# Patient Record
Sex: MALE | Race: WHITE | Employment: FULL TIME | URBAN - METROPOLITAN AREA
[De-identification: names, ages, dates, MRNs, and addresses within clinical notes are randomized per-mention and may not be internally consistent; named-entity substitution may affect disease eponyms.]

---

## 2018-07-10 ENCOUNTER — HOSPITAL ENCOUNTER (OUTPATIENT)
Age: 38
Discharge: HOME | End: 2018-07-10
Attending: EMERGENCY MEDICINE
Payer: COMMERCIAL

## 2018-07-10 ENCOUNTER — APPOINTMENT (OUTPATIENT)
Dept: GENERAL RADIOLOGY | Age: 38
End: 2018-07-10
Attending: EMERGENCY MEDICINE
Payer: COMMERCIAL

## 2018-07-10 VITALS
OXYGEN SATURATION: 96 % | SYSTOLIC BLOOD PRESSURE: 120 MMHG | TEMPERATURE: 99 F | HEIGHT: 71 IN | WEIGHT: 185 LBS | HEART RATE: 53 BPM | BODY MASS INDEX: 25.9 KG/M2 | RESPIRATION RATE: 14 BRPM | DIASTOLIC BLOOD PRESSURE: 78 MMHG

## 2018-07-10 DIAGNOSIS — S29.011A STRAIN OF CHEST WALL, INITIAL ENCOUNTER: ICD-10-CM

## 2018-07-10 DIAGNOSIS — R07.81 RIB PAIN: Primary | ICD-10-CM

## 2018-07-10 PROCEDURE — 99213 OFFICE O/P EST LOW 20 MIN: CPT

## 2018-07-10 PROCEDURE — 71101 X-RAY EXAM UNILAT RIBS/CHEST: CPT | Performed by: EMERGENCY MEDICINE

## 2018-07-10 NOTE — ED INITIAL ASSESSMENT (HPI)
Pt presents tonight with c/o injury to right ribs that occurred x 1.5 weeks ago. Pt states that he was helping his brother move and leaned over across the console in the moving truck and hit his ribs on the console.  Pt states that he heard a \"pop\" and ha

## 2018-07-10 NOTE — ED PROVIDER NOTES
Patient presents with:  Trauma (cardiovascular, musculoskeletal)    HPI:     Michele Farias is a 42year old male who presents with chief complaint of R sided rib pain.   8 days ago pt was leaning over his large console in his truck and reaching to try an ope leaning over a car console ten days ago and felt a pop to right ribs. Pain still to right lower anterior/lateral ribs. FINDINGS:  LUNGS:  No significant pulmonary parenchymal abnormalities and normal vascularity.  CARDIAC:  Normal size cardiac silhouette

## 2019-11-05 ENCOUNTER — HOSPITAL ENCOUNTER (OUTPATIENT)
Age: 39
Discharge: HOME | End: 2019-11-05
Payer: COMMERCIAL

## 2019-11-05 VITALS
OXYGEN SATURATION: 99 % | DIASTOLIC BLOOD PRESSURE: 93 MMHG | BODY MASS INDEX: 25 KG/M2 | RESPIRATION RATE: 16 BRPM | HEART RATE: 65 BPM | SYSTOLIC BLOOD PRESSURE: 125 MMHG | WEIGHT: 180 LBS | TEMPERATURE: 98 F

## 2019-11-05 DIAGNOSIS — J01.00 ACUTE NON-RECURRENT MAXILLARY SINUSITIS: Primary | ICD-10-CM

## 2019-11-05 PROCEDURE — 99214 OFFICE O/P EST MOD 30 MIN: CPT

## 2019-11-05 PROCEDURE — 99213 OFFICE O/P EST LOW 20 MIN: CPT

## 2019-11-05 RX ORDER — PSEUDOEPHEDRINE HCL 120 MG/1
120 TABLET, FILM COATED, EXTENDED RELEASE ORAL EVERY 12 HOURS
COMMUNITY

## 2019-11-05 RX ORDER — AMOXICILLIN AND CLAVULANATE POTASSIUM 875; 125 MG/1; MG/1
1 TABLET, FILM COATED ORAL 2 TIMES DAILY
Qty: 20 TABLET | Refills: 0 | Status: SHIPPED | OUTPATIENT
Start: 2019-11-05 | End: 2019-11-15

## 2019-11-05 RX ORDER — IBUPROFEN 200 MG
200 TABLET ORAL EVERY 6 HOURS PRN
COMMUNITY

## 2019-11-05 RX ORDER — METHYLPREDNISOLONE 4 MG/1
TABLET ORAL
Qty: 1 PACKAGE | Refills: 0 | Status: SHIPPED | OUTPATIENT
Start: 2019-11-05

## 2019-11-06 NOTE — ED PROVIDER NOTES
Patient Seen in: [id number] [facility] [place]      History   Patient presents with:  Cough/URI    Stated Complaint: Sinus Pressure and Congestion    75-year-old male presents today with complaints of worsening of sinus pressure congestion and nasal di and nursing note reviewed. Constitutional:       Appearance: He is well-developed. HENT:      Head: Normocephalic.       Right Ear: Tympanic membrane and ear canal normal.      Left Ear: Tympanic membrane and ear canal normal.      Nose: Mucosal edema a needed        Medications Prescribed:  Current Discharge Medication List    START taking these medications    Amoxicillin-Pot Clavulanate 875-125 MG Oral Tab  Take 1 tablet by mouth 2 (two) times daily for 10 days.   Qty: 20 tablet Refills: 0    methylPREDN